# Patient Record
Sex: MALE | Race: WHITE | NOT HISPANIC OR LATINO | URBAN - METROPOLITAN AREA
[De-identification: names, ages, dates, MRNs, and addresses within clinical notes are randomized per-mention and may not be internally consistent; named-entity substitution may affect disease eponyms.]

---

## 2021-04-14 ENCOUNTER — INPATIENT (INPATIENT)
Facility: HOSPITAL | Age: 71
LOS: 0 days | Discharge: HOME | DRG: 395 | End: 2021-04-15
Attending: SURGERY | Admitting: SURGERY
Payer: COMMERCIAL

## 2021-04-14 VITALS
OXYGEN SATURATION: 96 % | WEIGHT: 175.93 LBS | SYSTOLIC BLOOD PRESSURE: 165 MMHG | DIASTOLIC BLOOD PRESSURE: 95 MMHG | RESPIRATION RATE: 18 BRPM | TEMPERATURE: 99 F | HEART RATE: 74 BPM | HEIGHT: 70 IN

## 2021-04-14 LAB
ANION GAP SERPL CALC-SCNC: 10 MMOL/L — SIGNIFICANT CHANGE UP (ref 5–17)
APTT BLD: 32.9 SEC — SIGNIFICANT CHANGE UP (ref 27.5–35.5)
BASOPHILS # BLD AUTO: 0.02 K/UL — SIGNIFICANT CHANGE UP (ref 0–0.2)
BASOPHILS NFR BLD AUTO: 0.2 % — SIGNIFICANT CHANGE UP (ref 0–2)
BLD GP AB SCN SERPL QL: NEGATIVE — SIGNIFICANT CHANGE UP
BUN SERPL-MCNC: 16 MG/DL — SIGNIFICANT CHANGE UP (ref 7–23)
CALCIUM SERPL-MCNC: 9.7 MG/DL — SIGNIFICANT CHANGE UP (ref 8.4–10.5)
CHLORIDE SERPL-SCNC: 102 MMOL/L — SIGNIFICANT CHANGE UP (ref 96–108)
CO2 SERPL-SCNC: 27 MMOL/L — SIGNIFICANT CHANGE UP (ref 22–31)
CREAT SERPL-MCNC: 0.84 MG/DL — SIGNIFICANT CHANGE UP (ref 0.5–1.3)
EOSINOPHIL # BLD AUTO: 0.11 K/UL — SIGNIFICANT CHANGE UP (ref 0–0.5)
EOSINOPHIL NFR BLD AUTO: 1.2 % — SIGNIFICANT CHANGE UP (ref 0–6)
GLUCOSE SERPL-MCNC: 107 MG/DL — HIGH (ref 70–99)
HCT VFR BLD CALC: 40.8 % — SIGNIFICANT CHANGE UP (ref 39–50)
HGB BLD-MCNC: 13.6 G/DL — SIGNIFICANT CHANGE UP (ref 13–17)
IMM GRANULOCYTES NFR BLD AUTO: 0.1 % — SIGNIFICANT CHANGE UP (ref 0–1.5)
INR BLD: 0.93 — SIGNIFICANT CHANGE UP (ref 0.88–1.16)
LYMPHOCYTES # BLD AUTO: 2.37 K/UL — SIGNIFICANT CHANGE UP (ref 1–3.3)
LYMPHOCYTES # BLD AUTO: 25.2 % — SIGNIFICANT CHANGE UP (ref 13–44)
MCHC RBC-ENTMCNC: 29.8 PG — SIGNIFICANT CHANGE UP (ref 27–34)
MCHC RBC-ENTMCNC: 33.3 GM/DL — SIGNIFICANT CHANGE UP (ref 32–36)
MCV RBC AUTO: 89.5 FL — SIGNIFICANT CHANGE UP (ref 80–100)
MONOCYTES # BLD AUTO: 0.43 K/UL — SIGNIFICANT CHANGE UP (ref 0–0.9)
MONOCYTES NFR BLD AUTO: 4.6 % — SIGNIFICANT CHANGE UP (ref 2–14)
NEUTROPHILS # BLD AUTO: 6.48 K/UL — SIGNIFICANT CHANGE UP (ref 1.8–7.4)
NEUTROPHILS NFR BLD AUTO: 68.7 % — SIGNIFICANT CHANGE UP (ref 43–77)
NRBC # BLD: 0 /100 WBCS — SIGNIFICANT CHANGE UP (ref 0–0)
PLATELET # BLD AUTO: 187 K/UL — SIGNIFICANT CHANGE UP (ref 150–400)
POTASSIUM SERPL-MCNC: 3.7 MMOL/L — SIGNIFICANT CHANGE UP (ref 3.5–5.3)
POTASSIUM SERPL-SCNC: 3.7 MMOL/L — SIGNIFICANT CHANGE UP (ref 3.5–5.3)
PROTHROM AB SERPL-ACNC: 11.2 SEC — SIGNIFICANT CHANGE UP (ref 10.6–13.6)
RBC # BLD: 4.56 M/UL — SIGNIFICANT CHANGE UP (ref 4.2–5.8)
RBC # FLD: 14.2 % — SIGNIFICANT CHANGE UP (ref 10.3–14.5)
RH IG SCN BLD-IMP: POSITIVE — SIGNIFICANT CHANGE UP
SODIUM SERPL-SCNC: 139 MMOL/L — SIGNIFICANT CHANGE UP (ref 135–145)
WBC # BLD: 9.42 K/UL — SIGNIFICANT CHANGE UP (ref 3.8–10.5)
WBC # FLD AUTO: 9.42 K/UL — SIGNIFICANT CHANGE UP (ref 3.8–10.5)

## 2021-04-14 PROCEDURE — 99285 EMERGENCY DEPT VISIT HI MDM: CPT | Mod: CS

## 2021-04-14 RX ORDER — SOD SULF/SODIUM/NAHCO3/KCL/PEG
2000 SOLUTION, RECONSTITUTED, ORAL ORAL ONCE
Refills: 0 | Status: COMPLETED | OUTPATIENT
Start: 2021-04-14 | End: 2021-04-14

## 2021-04-14 RX ORDER — SODIUM CHLORIDE 9 MG/ML
1000 INJECTION, SOLUTION INTRAVENOUS
Refills: 0 | Status: DISCONTINUED | OUTPATIENT
Start: 2021-04-14 | End: 2021-04-15

## 2021-04-14 RX ORDER — ONDANSETRON 8 MG/1
4 TABLET, FILM COATED ORAL EVERY 6 HOURS
Refills: 0 | Status: DISCONTINUED | OUTPATIENT
Start: 2021-04-14 | End: 2021-04-15

## 2021-04-14 RX ADMIN — Medication 2000 MILLILITER(S): at 23:22

## 2021-04-14 NOTE — ED PROVIDER NOTE - PROGRESS NOTE DETAILS
Hgb stable.  Pt w/o bleeding currently.  Surg contacted and want to admit for further eval, tele, Dr Dimas.

## 2021-04-14 NOTE — H&P ADULT - NSHPLABSRESULTS_GEN_ALL_CORE
13.6   9.42  )-----------( 187      ( 14 Apr 2021 20:36 )             40.8   04-14    139  |  102  |  16  ----------------------------<  107<H>  3.7   |  27  |  0.84    Ca    9.7      14 Apr 2021 20:36

## 2021-04-14 NOTE — ED PROVIDER NOTE - TOBACCO USE
[FreeTextEntry1] : I offered patient reassurance that he is fine, and symptoms will jesus over time.  Will see PRN
Unknown if ever smoked

## 2021-04-14 NOTE — ED PROVIDER NOTE - GASTROINTESTINAL, MLM
Abdomen soft, non-tender, no guarding. old blood on underwear and on skin, no active bleeding, clots on surg resident's exam, none on my exam

## 2021-04-14 NOTE — H&P ADULT - HISTORY OF PRESENT ILLNESS
71 y/o M with no PMH and PSH of bilateral inguinal hernia repair (done separately many years ago) that presents with rectal bleed. Patient says he has hemorrhoids that he has to push inside many times, and has occasional blood when he wipes. He had a BM today that was regular, non-bloody, and a few hours afterwards he had rectal bleed, with no pain. He described that it was a lot and soaked his pants. He denies other symptoms. He had a colonoscopy 12 years ago that he described as normal. He is a non-smoker and drinks EtOH socially

## 2021-04-14 NOTE — H&P ADULT - NSHPPHYSICALEXAM_GEN_ALL_CORE
General: NAD, sitting comfortably in chair  HEENT: NC/AT, EOMI, PERRL  Resp; Non-labored breathing on RA  CV: regular rhythm, palpable distal pulses  Abdomen:; soft, non-distended, non-tender, prior well healed incisions  Rectal: no external hemorrhoids, no hard masses palpated, internal hemorrhoids palpated, no stool in rectal vault, clots seen on SYED  Extremities: warm, non-edematous

## 2021-04-14 NOTE — H&P ADULT - ASSESSMENT
69 y/o M with no PMH and PSH of bilateral inguinal hernia repair (done separately many years ago) that presents with rectal bleed. Patient says he has hemorrhoids that he has to push inside many times, and has occasional blood when he wipes. He had a BM today that was regular, non-bloody, and a few hours afterwards he had rectal bleed, with no pain. He had a colonoscopy 12 years ago that he described as normal. He is afebrile, HDS, with Hgb 13.6. He had no active bleeding during the exam    - Admit to tele  - NPO/IVF  - Moviprep for GI tomorrow  - GI consult placed  - SCDs, no AC

## 2021-04-14 NOTE — ED PROVIDER NOTE - CLINICAL SUMMARY MEDICAL DECISION MAKING FREE TEXT BOX
Pt here for rectal bleeding episode this afternoon x 1 h w/o abd, rectal pain. VSS, no bleeding on exam.  Plan labs.  Surg in ed at time of pt's arrival - state pt sent by Dr Dimas and they do not feel pt needs cta, will start w labs.

## 2021-04-14 NOTE — ED PROVIDER NOTE - OBJECTIVE STATEMENT
69 yo male no pmh c/o rectal bleeding - spontaneous while at his desk 2-330p.  No rectal/abd pain, h/o gib, cp, palpitations, sob, dizziness, weakness, other bleeding/bruising, blood thinners. Pt reports nl colonoscopy ~ 13 y ago.

## 2021-04-14 NOTE — ED ADULT TRIAGE NOTE - CHIEF COMPLAINT QUOTE
Patient reports one episode of rectal bleed "went through my pants". Patient denies weakness, dizziness, chest pain, SOB or fatigue, denies abdominal or rectal pain. Denies use of blood thinners.

## 2021-04-15 ENCOUNTER — TRANSCRIPTION ENCOUNTER (OUTPATIENT)
Age: 71
End: 2021-04-15

## 2021-04-15 VITALS — SYSTOLIC BLOOD PRESSURE: 163 MMHG | DIASTOLIC BLOOD PRESSURE: 82 MMHG

## 2021-04-15 LAB
ANION GAP SERPL CALC-SCNC: 10 MMOL/L — SIGNIFICANT CHANGE UP (ref 5–17)
BUN SERPL-MCNC: 13 MG/DL — SIGNIFICANT CHANGE UP (ref 7–23)
CALCIUM SERPL-MCNC: 9.3 MG/DL — SIGNIFICANT CHANGE UP (ref 8.4–10.5)
CHLORIDE SERPL-SCNC: 105 MMOL/L — SIGNIFICANT CHANGE UP (ref 96–108)
CO2 SERPL-SCNC: 26 MMOL/L — SIGNIFICANT CHANGE UP (ref 22–31)
COVID-19 SPIKE DOMAIN AB INTERP: POSITIVE
COVID-19 SPIKE DOMAIN ANTIBODY RESULT: >250 U/ML — HIGH
CREAT SERPL-MCNC: 0.73 MG/DL — SIGNIFICANT CHANGE UP (ref 0.5–1.3)
GLUCOSE SERPL-MCNC: 109 MG/DL — HIGH (ref 70–99)
HCT VFR BLD CALC: 41.9 % — SIGNIFICANT CHANGE UP (ref 39–50)
HCV AB S/CO SERPL IA: 0.07 S/CO — SIGNIFICANT CHANGE UP
HCV AB SERPL-IMP: SIGNIFICANT CHANGE UP
HGB BLD-MCNC: 13.6 G/DL — SIGNIFICANT CHANGE UP (ref 13–17)
MAGNESIUM SERPL-MCNC: 2.2 MG/DL — SIGNIFICANT CHANGE UP (ref 1.6–2.6)
MCHC RBC-ENTMCNC: 28.9 PG — SIGNIFICANT CHANGE UP (ref 27–34)
MCHC RBC-ENTMCNC: 32.5 GM/DL — SIGNIFICANT CHANGE UP (ref 32–36)
MCV RBC AUTO: 89.1 FL — SIGNIFICANT CHANGE UP (ref 80–100)
NRBC # BLD: 0 /100 WBCS — SIGNIFICANT CHANGE UP (ref 0–0)
PHOSPHATE SERPL-MCNC: 3.1 MG/DL — SIGNIFICANT CHANGE UP (ref 2.5–4.5)
PLATELET # BLD AUTO: 181 K/UL — SIGNIFICANT CHANGE UP (ref 150–400)
POTASSIUM SERPL-MCNC: 3.9 MMOL/L — SIGNIFICANT CHANGE UP (ref 3.5–5.3)
POTASSIUM SERPL-SCNC: 3.9 MMOL/L — SIGNIFICANT CHANGE UP (ref 3.5–5.3)
RBC # BLD: 4.7 M/UL — SIGNIFICANT CHANGE UP (ref 4.2–5.8)
RBC # FLD: 14.4 % — SIGNIFICANT CHANGE UP (ref 10.3–14.5)
SARS-COV-2 IGG+IGM SERPL QL IA: >250 U/ML — HIGH
SARS-COV-2 IGG+IGM SERPL QL IA: POSITIVE
SARS-COV-2 RNA SPEC QL NAA+PROBE: SIGNIFICANT CHANGE UP
SODIUM SERPL-SCNC: 141 MMOL/L — SIGNIFICANT CHANGE UP (ref 135–145)
WBC # BLD: 5.3 K/UL — SIGNIFICANT CHANGE UP (ref 3.8–10.5)
WBC # FLD AUTO: 5.3 K/UL — SIGNIFICANT CHANGE UP (ref 3.8–10.5)

## 2021-04-15 PROCEDURE — 99222 1ST HOSP IP/OBS MODERATE 55: CPT | Mod: 25

## 2021-04-15 PROCEDURE — 80048 BASIC METABOLIC PNL TOTAL CA: CPT

## 2021-04-15 PROCEDURE — 99222 1ST HOSP IP/OBS MODERATE 55: CPT | Mod: GC

## 2021-04-15 PROCEDURE — 86850 RBC ANTIBODY SCREEN: CPT

## 2021-04-15 PROCEDURE — 45378 DIAGNOSTIC COLONOSCOPY: CPT

## 2021-04-15 PROCEDURE — 86769 SARS-COV-2 COVID-19 ANTIBODY: CPT

## 2021-04-15 PROCEDURE — 85027 COMPLETE CBC AUTOMATED: CPT

## 2021-04-15 PROCEDURE — 86900 BLOOD TYPING SEROLOGIC ABO: CPT

## 2021-04-15 PROCEDURE — 86901 BLOOD TYPING SEROLOGIC RH(D): CPT

## 2021-04-15 PROCEDURE — 86803 HEPATITIS C AB TEST: CPT

## 2021-04-15 PROCEDURE — U0005: CPT

## 2021-04-15 PROCEDURE — 36415 COLL VENOUS BLD VENIPUNCTURE: CPT

## 2021-04-15 PROCEDURE — 83735 ASSAY OF MAGNESIUM: CPT

## 2021-04-15 PROCEDURE — 85025 COMPLETE CBC W/AUTO DIFF WBC: CPT

## 2021-04-15 PROCEDURE — 85610 PROTHROMBIN TIME: CPT

## 2021-04-15 PROCEDURE — 99285 EMERGENCY DEPT VISIT HI MDM: CPT | Mod: 25

## 2021-04-15 PROCEDURE — U0003: CPT

## 2021-04-15 PROCEDURE — 84100 ASSAY OF PHOSPHORUS: CPT

## 2021-04-15 PROCEDURE — 85730 THROMBOPLASTIN TIME PARTIAL: CPT

## 2021-04-15 RX ADMIN — SODIUM CHLORIDE 120 MILLILITER(S): 9 INJECTION, SOLUTION INTRAVENOUS at 04:15

## 2021-04-15 NOTE — ED ADULT NURSE NOTE - OBJECTIVE STATEMENT
pt a&ox4 resting comfortably in stretcher. pt reports 1 episode bloody stool today. denies pain, cp, sob, n v,fevers, cough, weakness, ha. pt offers no complaints.

## 2021-04-15 NOTE — DISCHARGE NOTE PROVIDER - NSDCCPTREATMENT_GEN_ALL_CORE_FT
PRINCIPAL PROCEDURE  Procedure: Colonoscopy  Findings and Treatment: Please follow with Dr Eduardo Chowdhury one month from discharge. Please continue to follow a clear liquid diet and advance as tolerated.

## 2021-04-15 NOTE — DISCHARGE NOTE PROVIDER - HOSPITAL COURSE
69 y/o M with no PMH and PSH of bilateral inguinal hernia repair (done separately many years ago) that presents with rectal bleed. Patient says he has hemorrhoids that he has to push inside many times, and has occasional blood when he wipes. He had a BM today that was regular, non-bloody, and a few hours afterwards he had rectal bleed, with no pain. He described that it was a lot and soaked his pants. He denies other symptoms. He had a colonoscopy 12 years ago that he described as normal. He is a non-smoker and drinks EtOH socially. During patients hospital course, patient was prepped for colonoscopy and was taken for procedure on 4/15. During colonoscopy patient was found to have no active bleeding, visualization of internal and external hemorrhoids were noted. Patient tolerated procedure well. Patient was medically optimized, stable and ready for discharge. Plan of care and return precautions were discussed with the patient who verbally stated understanding.

## 2021-04-15 NOTE — DISCHARGE NOTE PROVIDER - CARE PROVIDER_API CALL
Herrera Dimas)  ColonRectal Surgery; Surgery  [address], [address] [place] [address]  Phone: [phone]  Fax: [phone]  Follow Up Time: 2 weeks    Eduardo Chowdhury)  Gastroenterology; Internal Medicine  [address], [address] [place] [address]  Phone: [phone]  Fax: [phone]  Follow Up Time: 1 month

## 2021-04-15 NOTE — PROGRESS NOTE ADULT - SUBJECTIVE AND OBJECTIVE BOX
INTERVAL HPI/OVERNIGHT EVENTS: admitted, 2L Moviprep given, GI consulted for colonoscopy in AM, VSS     SUBJECTIVE: Pt seen and examined at bedside this am by surgery team. Reports no acute complaints, Tolerating diet, pain well controlled. Denies f/n/v/cp/sob.    MEDICATIONS  (STANDING):  lactated ringers. 1000 milliLiter(s) (120 mL/Hr) IV Continuous <Continuous>    MEDICATIONS  (PRN):  ondansetron Injectable 4 milliGRAM(s) IV Push every 6 hours PRN Nausea    Vital Signs Last 24 Hrs  T(C): 36.1 (15 Apr 2021 05:10), Max: 37.2 (14 Apr 2021 19:37)  T(F): 97 (15 Apr 2021 05:10), Max: 98.9 (14 Apr 2021 19:37)  HR: 49 (15 Apr 2021 05:10) (49 - 74)  BP: 139/70 (15 Apr 2021 05:10) (139/70 - 165/95)  BP(mean): 96 (15 Apr 2021 05:10) (96 - 96)  RR: 18 (15 Apr 2021 05:10) (17 - 18)  SpO2: 95% (15 Apr 2021 05:10) (95% - 97%)    PHYSICAL EXAM:    Constitutional: A&Ox3, NAD    Respiratory: non labored breathing, no respiratory distress    Cardiovascular: NSR, RRR    Gastrointestinal: abdomen soft, non-distended, non-tender, prior well healed incisions                  Extremities: wwp, no calf tenderness or edema. SCDs in place       I&O's Detail    14 Apr 2021 07:01  -  15 Apr 2021 07:00  --------------------------------------------------------  IN:    Lactated Ringers: 360 mL  Total IN: 360 mL    OUT:  Total OUT: 0 mL    Total NET: 360 mL          LABS:                        13.6   9.42  )-----------( 187      ( 14 Apr 2021 20:36 )             40.8     04-14    139  |  102  |  16  ----------------------------<  107<H>  3.7   |  27  |  0.84    Ca    9.7      14 Apr 2021 20:36      PT/INR - ( 14 Apr 2021 20:36 )   PT: 11.2 sec;   INR: 0.93          PTT - ( 14 Apr 2021 20:36 )  PTT:32.9 sec      RADIOLOGY & ADDITIONAL STUDIES:

## 2021-04-15 NOTE — DISCHARGE NOTE PROVIDER - PROVIDER TOKENS
PROVIDER:[TOKEN:[[id number]],FOLLOWUP:[2 weeks]],PROVIDER:[TOKEN:[[id number]],FOLLOWUP:[1 month]]

## 2021-04-15 NOTE — DISCHARGE NOTE PROVIDER - NSDCCAREPROVSEEN_GEN_ALL_CORE_FT
I reviewed the H&P, I examined the patient, and there are no changes in the patient's condition.     Herrera Dimas

## 2021-04-15 NOTE — DISCHARGE NOTE PROVIDER - CARE PROVIDERS DIRECT ADDRESSES
[email or web address]

## 2021-04-15 NOTE — CONSULT NOTE ADULT - ASSESSMENT
69 y/o M with no significant PMHx presents with rectal bleeding.      #Rectal Bleeding  Acute onset with hx of hemorrhoidal bleeding.  Found to have blood clots in rectal vault on presentation.  HD and hgb stable.  -Keep NPO  -Colonoscopy today  -Trend CBC  -Maintain active T&S and large bore IV  -Transfusion goal per primary team    -Please notify GI team if patient develops overt GI bleeding or change in hemodynamics

## 2021-04-15 NOTE — CONSULT NOTE ADULT - PROVIDER SPECIALTY LIST ADULT
Gastroenterology Alar Island Pedicle Flap Text: The defect edges were debeveled with a #15 scalpel blade.  Given the location of the defect, shape of the defect and the proximity to the alar rim an island pedicle advancement flap was deemed most appropriate.  Using a sterile surgical marker, an appropriate advancement flap was drawn incorporating the defect, outlining the appropriate donor tissue and placing the expected incisions within the nasal ala running parallel to the alar rim. The area thus outlined was incised with a #15 scalpel blade.  The skin margins were undermined minimally to an appropriate distance in all directions around the primary defect and laterally outward around the island pedicle utilizing iris scissors.  There was minimal undermining beneath the pedicle flap.

## 2021-04-15 NOTE — PROGRESS NOTE ADULT - ASSESSMENT
71 y/o M with no PMH and PSH of bilateral inguinal hernia repair (done separately many years ago) that presents with rectal bleed. Patient says he has hemorrhoids that he has to push inside many times, and has occasional blood when he wipes. He had a BM today that was regular, non-bloody, and a few hours afterwards he had rectal bleed, with no pain. He had a colonoscopy 12 years ago that he described as normal. Afebrile, HDS, with Hgb 13.6.     - NPO/IVF  - Moviprep given, colonoscopy with GI today  - OOBA/SCDs  - Holding pharm DVT ppx   - AM labs

## 2021-04-15 NOTE — ED ADULT NURSE NOTE - NSIMPLEMENTINTERV_GEN_ALL_ED
Implemented All Universal Safety Interventions:  [place] to call system. Call bell, personal items and telephone within reach. Instruct patient to call for assistance. Room bathroom lighting operational. Non-slip footwear when patient is off stretcher. Physically safe environment: no spills, clutter or unnecessary equipment. Stretcher in lowest position, wheels locked, appropriate side rails in place.

## 2021-04-15 NOTE — CONSULT NOTE ADULT - SUBJECTIVE AND OBJECTIVE BOX
HPI:  71 y/o M with no significant PMHx presents with rectal bleeding.  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  Denies any pain.  He went to urgent care and was referred to the ED.  He reports hx of hemorrhoid that can be pushed in.  Occasional bleeding but never an issue.  Denies fever, chill, cp, sob, abd pain, nausea, vomiting, diarrhea, constipation.  Tolerated bowel prep yesterday and reports clear stool, though he does not recall exact color of last BM. Denies use of AC/NSAIDs.  He reports having an EGD/colonoscopy 12 years ago that he described as normal.      Allergies    No Known Allergies    Intolerances      Home Medications:    MEDICATIONS:  MEDICATIONS  (STANDING):  lactated ringers. 1000 milliLiter(s) (120 mL/Hr) IV Continuous <Continuous>    MEDICATIONS  (PRN):  ondansetron Injectable 4 milliGRAM(s) IV Push every 6 hours PRN Nausea    PAST MEDICAL & SURGICAL HISTORY:  No pertinent past medical history    No significant past surgical history      FAMILY HISTORY:  Brother with CF    SOCIAL HISTORY:  Tobacco: denies  Alcohol: socially  Illicit Drugs: denies    REVIEW OF SYSTEMS:  All other 10 review of systems is negative except as indicated in HPI    Vital Signs Last 24 Hrs  T(C): 36.4 (15 Apr 2021 09:00), Max: 37.2 (14 Apr 2021 19:37)  T(F): 97.5 (15 Apr 2021 09:00), Max: 98.9 (14 Apr 2021 19:37)  HR: 50 (15 Apr 2021 09:00) (49 - 74)  BP: 165/89 (15 Apr 2021 09:00) (139/70 - 165/95)  BP(mean): 120 (15 Apr 2021 09:00) (96 - 120)  RR: 16 (15 Apr 2021 09:00) (16 - 18)  SpO2: 98% (15 Apr 2021 09:00) (95% - 98%)    04-14 @ 07:01  -  04-15 @ 07:00  --------------------------------------------------------  IN: 360 mL / OUT: 0 mL / NET: 360 mL        PHYSICAL EXAM:    General: Well developed; well nourished; in no acute distress  Eyes: moist conjunctivae, sclerae anicteric  HENT: Moist mucous membranes, tongue midline  Neck: Trachea midline, supple  Lungs: Normal respiratory effort and no intercostal retractions  Cardiovascular: RRR, S1S2  Abdomen: Soft, non-tender non-distended; Normal bowel sounds; No rebound or guarding  Extremities: Normal range of motion, No clubbing, cyanosis or edema  Neurological: Alert and oriented x3, nonfocal exam  Skin: Warm and dry. No obvious rash    LABS:                        13.6   5.30  )-----------( 181      ( 15 Apr 2021 09:16 )             41.9     04-15    141  |  105  |  13  ----------------------------<  109<H>  3.9   |  26  |  0.73    Ca    9.3      15 Apr 2021 09:16  Phos  3.1     04-15  Mg     2.2     04-15          PT/INR - ( 14 Apr 2021 20:36 )   PT: 11.2 sec;   INR: 0.93          PTT - ( 14 Apr 2021 20:36 )  PTT:32.9 sec    RADIOLOGY & ADDITIONAL STUDIES:

## 2021-04-15 NOTE — DISCHARGE NOTE NURSING/CASE MANAGEMENT/SOCIAL WORK - PATIENT PORTAL LINK FT
You can access the FollowMyHealth Patient Portal offered by [facility] by registering at the following website: [email or web address]. By joining [employer]’s FollowMyHealth portal, you will also be able to view your health information using other applications (apps) compatible with our system.

## 2021-04-15 NOTE — DISCHARGE NOTE PROVIDER - NSDCCPCAREPLAN_GEN_ALL_CORE_FT
PRINCIPAL DISCHARGE DIAGNOSIS  Diagnosis: Rectal bleeding  Assessment and Plan of Treatment: Please follow up with Dr. Dimas 2 weeks from the date of discahrge. Call his office to schedule an appointment: [phone]. Call the office if you experience increasing pain, nausea, vomiting, swelling, redness, or leakage from stoma site, temperature >101.4F.

## 2021-04-15 NOTE — CHART NOTE - NSCHARTNOTEFT_GEN_A_CORE
Procedure Note  Colonoscopy  Attending: Dr. Chowdhury    Findings: Normal mucosa of the examined colon.  Tortuous colon.  Internal and external hemorrhoid.  No evidence of active bleeding.  Suspect recent bleeding from hemorrhoids.    1. Internal and external hemorrhoid.  No evidence of active bleeding.  Suspect recent bleeding from hemorrhoids.    Recommendation:  Advance diet as tolerated  Trend CBC and monitor BM

## 2021-04-22 DIAGNOSIS — K64.2 THIRD DEGREE HEMORRHOIDS: ICD-10-CM

## 2021-04-22 DIAGNOSIS — K64.4 RESIDUAL HEMORRHOIDAL SKIN TAGS: ICD-10-CM

## 2021-04-22 DIAGNOSIS — K62.5 HEMORRHAGE OF ANUS AND RECTUM: ICD-10-CM

## 2021-04-22 PROBLEM — Z78.9 OTHER SPECIFIED HEALTH STATUS: Chronic | Status: ACTIVE | Noted: 2021-04-14

## 2021-04-26 PROBLEM — Z00.00 ENCOUNTER FOR PREVENTIVE HEALTH EXAMINATION: Status: ACTIVE | Noted: 2021-04-26

## 2021-05-03 ENCOUNTER — APPOINTMENT (OUTPATIENT)
Dept: COLORECTAL SURGERY | Facility: CLINIC | Age: 71
End: 2021-05-03
Payer: MEDICARE

## 2021-05-03 VITALS
HEART RATE: 58 BPM | BODY MASS INDEX: 25.62 KG/M2 | HEIGHT: 70 IN | SYSTOLIC BLOOD PRESSURE: 165 MMHG | TEMPERATURE: 98.1 F | DIASTOLIC BLOOD PRESSURE: 80 MMHG | WEIGHT: 179 LBS

## 2021-05-03 DIAGNOSIS — K64.8 OTHER HEMORRHOIDS: ICD-10-CM

## 2021-05-03 PROCEDURE — 46600 DIAGNOSTIC ANOSCOPY SPX: CPT

## 2021-05-03 PROCEDURE — 99212 OFFICE O/P EST SF 10 MIN: CPT | Mod: 25

## 2021-05-03 NOTE — PHYSICAL EXAM
[Excoriation] : no perianal excoriation [Manually Reducible] : a manually reducible (grade III) [Normal] : was normal [None] : there was no rectal mass  [FreeTextEntry1] : A lighted anoscope was passed into the anal canal and the entire anal mucosal surface was inspected..  The findings revealed moderate/Large internal hemorrhoids with right anterior quadrant papilla. No masses or lesions were identified.

## 2021-05-03 NOTE — ASSESSMENT
[FreeTextEntry1] : The patient was advised options for treatment including her band ligation an excisional hemorrhoidectomy. Risks, benefits alternatives reviewed in detail. Patient wishes to continue with conservative management. Advised to return if he has recurrent bleeding.\par

## 2021-05-03 NOTE — HISTORY OF PRESENT ILLNESS
[FreeTextEntry1] : 71 yo M presents for post hospitalization follow up for rectal bleeding\par \par Pt presented to [facility] 4/14 for c/o prolapsing hemorrhoids and rectal bleeding x 1 that soaked his clothing. Pt underwent colonoscopy 4/15, no active bleeding identified, noted int/ext hemorrhoids\par Pt reports he is doing well, however hemorrhoids continue to prolapse w/ every BM. \par + scant BRB noted on TP occasionally\par Denies pain, itching\par Not using topical medications\par BH: twice daily, admits to occasional constipation and straining\par Reports adequate dietary fiber intake, drinks 4-5 cups water daily\par Denies fiber supplementation or stool softener use\par Denies FMH CRC, father w/ stomach CA\par Denies ASA/NSAID use

## 2025-01-05 NOTE — DISCHARGE NOTE PROVIDER - NSDCADMDATE_GEN_ALL_CORE_FT
Bedside report with Maddison LEMUS. Pt alert and oriented x4, family at bedside. Call light in reach. Bed in lowest position. Seizure precautions in place. Fall education provided. Fall precautions in place. Plan of care discussed with pt. Pt agreeing to plan of care. Communication board updated. All questions answered. Assessment completed.    14-Apr-2021 21:25

## 2025-02-03 ENCOUNTER — APPOINTMENT (OUTPATIENT)
Dept: UROLOGY | Facility: CLINIC | Age: 75
End: 2025-02-03
Payer: MEDICARE

## 2025-02-03 VITALS
HEIGHT: 70 IN | HEART RATE: 57 BPM | RESPIRATION RATE: 17 BRPM | OXYGEN SATURATION: 99 % | SYSTOLIC BLOOD PRESSURE: 184 MMHG | TEMPERATURE: 98.3 F | BODY MASS INDEX: 25.05 KG/M2 | WEIGHT: 175 LBS | DIASTOLIC BLOOD PRESSURE: 77 MMHG

## 2025-02-03 DIAGNOSIS — R97.20 ELEVATED PROSTATE, SPECIFIC ANTIGEN [PSA]: ICD-10-CM

## 2025-02-03 DIAGNOSIS — Z80.0 FAMILY HISTORY OF MALIGNANT NEOPLASM OF DIGESTIVE ORGANS: ICD-10-CM

## 2025-02-03 DIAGNOSIS — N40.1 BENIGN PROSTATIC HYPERPLASIA WITH LOWER URINARY TRACT SYMPMS: ICD-10-CM

## 2025-02-03 DIAGNOSIS — Z78.9 OTHER SPECIFIED HEALTH STATUS: ICD-10-CM

## 2025-02-03 PROCEDURE — 51798 US URINE CAPACITY MEASURE: CPT | Mod: 59

## 2025-02-03 PROCEDURE — 99204 OFFICE O/P NEW MOD 45 MIN: CPT | Mod: 25

## 2025-02-03 PROCEDURE — 51741 ELECTRO-UROFLOWMETRY FIRST: CPT

## 2025-02-07 LAB
4K SCORE: 15.8
FREE PSA (BIOREFERENCE): 0.56 NG/ML
PERCENT FREE PSA: 20 %
TOTAL PSA (BIOREFERENCE): 2.83 NG/ML

## 2025-02-11 ENCOUNTER — NON-APPOINTMENT (OUTPATIENT)
Age: 75
End: 2025-02-11

## 2025-08-04 ENCOUNTER — APPOINTMENT (OUTPATIENT)
Dept: UROLOGY | Facility: CLINIC | Age: 75
End: 2025-08-04
Payer: MEDICARE

## 2025-08-04 VITALS
WEIGHT: 175 LBS | SYSTOLIC BLOOD PRESSURE: 186 MMHG | HEART RATE: 69 BPM | TEMPERATURE: 98.3 F | OXYGEN SATURATION: 98 % | BODY MASS INDEX: 25.05 KG/M2 | HEIGHT: 70 IN | DIASTOLIC BLOOD PRESSURE: 77 MMHG

## 2025-08-04 DIAGNOSIS — N40.1 BENIGN PROSTATIC HYPERPLASIA WITH LOWER URINARY TRACT SYMPMS: ICD-10-CM

## 2025-08-04 PROCEDURE — 51798 US URINE CAPACITY MEASURE: CPT

## 2025-08-04 PROCEDURE — 51741 ELECTRO-UROFLOWMETRY FIRST: CPT

## 2025-08-04 PROCEDURE — 99213 OFFICE O/P EST LOW 20 MIN: CPT
